# Patient Record
Sex: MALE | Race: WHITE | Employment: OTHER | URBAN - METROPOLITAN AREA
[De-identification: names, ages, dates, MRNs, and addresses within clinical notes are randomized per-mention and may not be internally consistent; named-entity substitution may affect disease eponyms.]

---

## 2019-09-27 ENCOUNTER — HOSPITAL ENCOUNTER (EMERGENCY)
Facility: CLINIC | Age: 59
Discharge: HOME | End: 2019-09-27
Attending: EMERGENCY MEDICINE | Admitting: EMERGENCY MEDICINE
Payer: COMMERCIAL

## 2019-09-27 ENCOUNTER — APPOINTMENT (OUTPATIENT)
Dept: GENERAL RADIOLOGY | Facility: CLINIC | Age: 59
End: 2019-09-27
Attending: EMERGENCY MEDICINE
Payer: COMMERCIAL

## 2019-09-27 VITALS
SYSTOLIC BLOOD PRESSURE: 122 MMHG | TEMPERATURE: 97.2 F | OXYGEN SATURATION: 100 % | DIASTOLIC BLOOD PRESSURE: 78 MMHG | WEIGHT: 180 LBS | HEART RATE: 67 BPM | RESPIRATION RATE: 18 BRPM

## 2019-09-27 DIAGNOSIS — R55 VASOVAGAL SYNCOPE: ICD-10-CM

## 2019-09-27 DIAGNOSIS — R19.7 DIARRHEA OF PRESUMED INFECTIOUS ORIGIN: ICD-10-CM

## 2019-09-27 DIAGNOSIS — S46.811A TRAPEZIUS STRAIN, RIGHT, INITIAL ENCOUNTER: ICD-10-CM

## 2019-09-27 LAB — INTERPRETATION ECG - MUSE: NORMAL

## 2019-09-27 PROCEDURE — 72040 X-RAY EXAM NECK SPINE 2-3 VW: CPT

## 2019-09-27 PROCEDURE — 73010 X-RAY EXAM OF SHOULDER BLADE: CPT | Mod: RT

## 2019-09-27 PROCEDURE — 99285 EMERGENCY DEPT VISIT HI MDM: CPT

## 2019-09-27 PROCEDURE — 25000132 ZZH RX MED GY IP 250 OP 250 PS 637: Performed by: EMERGENCY MEDICINE

## 2019-09-27 PROCEDURE — 93005 ELECTROCARDIOGRAM TRACING: CPT

## 2019-09-27 PROCEDURE — 73030 X-RAY EXAM OF SHOULDER: CPT | Mod: RT

## 2019-09-27 RX ORDER — OXYCODONE HYDROCHLORIDE 5 MG/1
5 TABLET ORAL EVERY 6 HOURS PRN
Qty: 8 TABLET | Refills: 0 | Status: SHIPPED | OUTPATIENT
Start: 2019-09-27

## 2019-09-27 RX ORDER — OXYCODONE HYDROCHLORIDE 5 MG/1
5 TABLET ORAL ONCE
Status: COMPLETED | OUTPATIENT
Start: 2019-09-27 | End: 2019-09-27

## 2019-09-27 RX ADMIN — OXYCODONE HYDROCHLORIDE 5 MG: 5 TABLET ORAL at 03:06

## 2019-09-27 ASSESSMENT — ENCOUNTER SYMPTOMS
SHORTNESS OF BREATH: 0
ROS GI COMMENTS: NO BLACK STOOLS
NUMBNESS: 0
DIAPHORESIS: 1
ABDOMINAL DISTENTION: 1
BLOOD IN STOOL: 0
DIARRHEA: 1
WEAKNESS: 0

## 2019-09-27 NOTE — ED PROVIDER NOTES
"  History     Chief Complaint:  Loss of Consciousness and Shoulder Injury    The history is provided by the patient.      Rupesh Booth is a 59 year old otherwise healthy male who presents to the emergency department today for evaluation after losing consciousness and sustaining a shoulder injury. The patient reports he woke up around 0030 to use the restroom, while there he reports feeling sweaty, and having significant diarrhea. Upon walking back to bed, he reports losing consciousness, and when he fell he injured his right shoulder. He says the pain is to the \"midline of the scapula of the right upper back\" and that it radiates somewhat into his neck on that side. The patient adds he was outside all day and had an abnormal meal; he suspects his diarrhea was due to this. The patient took 600 mg of Ibuprofen prior to presenting here tonight. He had no associated chest pain or trouble breathing and he denies having any numbness or weakness in any extremity. He denies any black or bloody stools.     Allergies:  No Known Drug Allergies     Medications:    Medications reviewed. No pertinent medications.     Past Medical History:    Medical history reviewed. No pertinent history was found.     Past Surgical History:    Surgical history reviewed. No pertinent surgical history.    Family History:    Family history reviewed. No pertinent family history.     Social History:  The patient was accompanied to the ED by his wife.  The patient is a dentist.   Marital Status:       Review of Systems   Constitutional: Positive for diaphoresis.   Respiratory: Negative for shortness of breath.    Cardiovascular: Negative for chest pain.   Gastrointestinal: Positive for abdominal distention and diarrhea. Negative for blood in stool.        No black stools   Musculoskeletal:        Shoulder pain, right    Neurological: Positive for syncope. Negative for weakness and numbness.   All other systems reviewed and are " negative.      Physical Exam     Patient Vitals for the past 24 hrs:   BP Temp Pulse Resp SpO2 Weight   09/27/19 0233 (!) 130/91 97.2  F (36.2  C) 67 18 97 % 81.6 kg (180 lb)      Physical Exam  Nursing note and vitals reviewed.  Constitutional: Cooperative.   HENT:   Mouth/Throat: Mucous membranes are normal. Full ROM of neck.  No SP tenderness.   Cardiovascular: Normal rate, regular rhythm and normal heart sounds.  No murmur. 2+ right radial pulse.   Pulmonary/Chest: Effort normal and breath sounds normal. No respiratory distress. No wheezes.  Musculoskeletal: Full range of motion of right shoulder and UE.   Neurological: Alert. Strength and sensation in distal right upper extremity normal.   Skin: Skin is warm and dry.  Psychiatric: Normal mood and affect.      Emergency Department Course     ECG:  ECG taken at 0243, ECG read at 0245  Normal sinus rhythm  Normal ECG   Rate 68 bpm. CO interval 164. QRS duration 76. QT/QTc 396/421. P-R-T axes 58 36 31.     Imaging:  Radiology findings were communicated with the patient and family who voiced understanding of the findings.    XR, R Scapula  No acute fracture or dislocation.  Reading per radiology    XR, R Shoulder G/E 3 Views  No acute fracture or dislocation.  Reading per radiology    XR, Cervical Spine, 2-3 Views  No acute fracture or malalignment. Multilevel degenerative disease.  Reading per radiology    Interventions:  0306 Oxycodone 5 mg PO    Emergency Department Course:  0243 An ECG was performed, results above.   0245 Nursing notes and vitals reviewed.  0250 I performed an exam of the patient as documented above.   0250 Patient declined blood work.   0323 The patient was sent for x-rays while in the emergency department, results above.    0400 Patient was rechecked and updated with imaging results prior to discharge.    Findings and plan explained to the Patient and spouse. Patient discharged home with instructions regarding supportive care, medications, and  reasons to return. The importance of close follow-up was reviewed. The patient was prescribed Oxycodone.     I personally reviewed the imaging results with the Patient and spouse and answered all related questions prior to discharge.    Impression & Plan      Medical Decision Making:  Rupesh Booth is a 59 year old gentleman who presents after a syncopal event following an episode of diarrhea. The etiology of this is consistent with vasovagal causes. Unlikely to represent an acute cardiovascular or neurologic event. EKG is reassuring and his neurologic exam at this time is normal. He has declined any blood work which is reasonable. His vital signs are reassuring here. X-rays of the shoulder, scapula, and neck are negative. His neurovascular exam of the right upper extremity is normal at this time. I suspect a strain of the trapezius musculature as well as a brachial plexus. If his pain persists he should follow up with orthopedics. Rest, ice, Ibuprofen, and a sling as needed and he will be discharged home.     Diagnosis:    ICD-10-CM    1. Diarrhea of presumed infectious origin R19.7    2. Vasovagal syncope R55    3. Trapezius strain, right, initial encounter S46.811A        Disposition:  The patient is discharged to home.     Discharge Medications:  New Prescriptions    OXYCODONE (ROXICODONE) 5 MG TABLET    Take 1 tablet (5 mg) by mouth every 6 hours as needed for pain       Scribe Disclosure:  I, Nena Duckworth, am serving as a scribe at 2:47 AM on 9/27/2019 to document services personally performed by Jakub Ross MD based on my observations and the provider's statements to me.    9/27/2019   [facility] EMERGENCY DEPARTMENT       Jakub Ross MD  09/27/19 0455

## 2019-09-27 NOTE — ED TRIAGE NOTES
Pt in with C/O syncopal episode tonight after vomiting in the bathroom. Pt reports R shoulder pain following syncope, denies head strike. Pt A&Ox4 ABCD's intact

## 2019-09-27 NOTE — ED AVS SNAPSHOT
[facility] Emergency Department  [address]  [facility] [address]  Phone:  [phone]  Fax:  [phone]                                    Rupesh Booth   MRN: [id number]    Department:  [facility] Emergency Department   Date of Visit:  9/27/2019           After Visit Summary Signature Page    I have received my discharge instructions, and my questions have been answered. I have discussed any challenges I see with this plan with the nurse or doctor.    ..........................................................................................................................................  Patient/Patient Representative Signature      ..........................................................................................................................................  Patient Representative Print Name and Relationship to Patient    ..................................................               ................................................  Date                                   Time    ..........................................................................................................................................  Reviewed by Signature/Title    ...................................................              ..............................................  Date                                               Time          22EPIC Rev 08/18

## 2021-06-03 ENCOUNTER — RECORDS - HEALTHEAST (OUTPATIENT)
Dept: ADMINISTRATIVE | Facility: CLINIC | Age: 61
End: 2021-06-03